# Patient Record
Sex: FEMALE | Race: OTHER | URBAN - NONMETROPOLITAN AREA
[De-identification: names, ages, dates, MRNs, and addresses within clinical notes are randomized per-mention and may not be internally consistent; named-entity substitution may affect disease eponyms.]

---

## 2023-07-07 ENCOUNTER — OFFICE VISIT (OUTPATIENT)
Dept: URBAN - NONMETROPOLITAN AREA CLINIC 1 | Facility: CLINIC | Age: 58
End: 2023-07-07
Payer: OTHER GOVERNMENT

## 2023-07-07 DIAGNOSIS — E11.9 TYPE 2 DIABETES MELLITUS W/O COMPLICATION: Primary | ICD-10-CM

## 2023-07-07 DIAGNOSIS — Z13.5 ENCOUNTER FOR SCREENING FOR EYE AND EAR DISORDERS: ICD-10-CM

## 2023-07-07 PROCEDURE — 99204 OFFICE O/P NEW MOD 45 MIN: CPT | Performed by: OPTOMETRIST

## 2023-07-07 ASSESSMENT — VISUAL ACUITY
OD: 20/20
OS: 20/20

## 2023-07-07 ASSESSMENT — INTRAOCULAR PRESSURE
OS: 17
OD: 18

## 2023-07-07 ASSESSMENT — KERATOMETRY
OD: 42.88
OS: 43.88

## 2023-07-07 NOTE — IMPRESSION/PLAN
Impression: Type 2 diabetes mellitus w/o complication: H42.0. Plan: Diabetes type II: no background retinopathy. Discussed ocular and systemic benefits of blood sugar and blood pressure control. Call the office if any changes in vision status or ocular symptoms occur.

## 2023-07-07 NOTE — IMPRESSION/PLAN
Impression: Encounter for screening for eye and ear disorders: Z13.5. Plan: Patient/Guardian elected [employer] screening OU. Dr Callum Evangelista discussed findings with patient/ guardian today.